# Patient Record
Sex: FEMALE | Race: BLACK OR AFRICAN AMERICAN | NOT HISPANIC OR LATINO | URBAN - METROPOLITAN AREA
[De-identification: names, ages, dates, MRNs, and addresses within clinical notes are randomized per-mention and may not be internally consistent; named-entity substitution may affect disease eponyms.]

---

## 2023-10-13 ENCOUNTER — HOSPITAL ENCOUNTER (EMERGENCY)
Facility: HOSPITAL | Age: 28
Discharge: HOME | End: 2023-10-14
Payer: COMMERCIAL

## 2023-10-13 DIAGNOSIS — N76.0 BACTERIAL VAGINOSIS: Primary | ICD-10-CM

## 2023-10-13 DIAGNOSIS — B37.31 YEAST VAGINITIS: ICD-10-CM

## 2023-10-13 DIAGNOSIS — B96.89 BACTERIAL VAGINOSIS: Primary | ICD-10-CM

## 2023-10-13 DIAGNOSIS — R11.2 NAUSEA AND VOMITING, UNSPECIFIED VOMITING TYPE: ICD-10-CM

## 2023-10-13 PROCEDURE — 83690 ASSAY OF LIPASE: CPT | Mod: CMCLAB

## 2023-10-13 PROCEDURE — 96375 TX/PRO/DX INJ NEW DRUG ADDON: CPT

## 2023-10-13 PROCEDURE — 76815 OB US LIMITED FETUS(S): CPT

## 2023-10-13 PROCEDURE — 96374 THER/PROPH/DIAG INJ IV PUSH: CPT

## 2023-10-13 PROCEDURE — 99284 EMERGENCY DEPT VISIT MOD MDM: CPT

## 2023-10-13 PROCEDURE — 96361 HYDRATE IV INFUSION ADD-ON: CPT

## 2023-10-13 PROCEDURE — 87389 HIV-1 AG W/HIV-1&-2 AB AG IA: CPT | Mod: CMCLAB

## 2023-10-13 PROCEDURE — 2580000001 HC RX 258 IV SOLUTIONS

## 2023-10-13 PROCEDURE — 80053 COMPREHEN METABOLIC PANEL: CPT | Mod: CMCLAB

## 2023-10-13 PROCEDURE — 86780 TREPONEMA PALLIDUM: CPT

## 2023-10-13 PROCEDURE — 36415 COLL VENOUS BLD VENIPUNCTURE: CPT | Mod: CMCLAB

## 2023-10-13 PROCEDURE — 2500000004 HC RX 250 GENERAL PHARMACY W/ HCPCS (ALT 636 FOR OP/ED)

## 2023-10-13 PROCEDURE — 87210 SMEAR WET MOUNT SALINE/INK: CPT

## 2023-10-13 PROCEDURE — 82947 ASSAY GLUCOSE BLOOD QUANT: CPT | Mod: 59

## 2023-10-13 PROCEDURE — 99284 EMERGENCY DEPT VISIT MOD MDM: CPT | Mod: 25

## 2023-10-13 PROCEDURE — 85025 COMPLETE CBC W/AUTO DIFF WBC: CPT

## 2023-10-13 RX ORDER — PROCHLORPERAZINE EDISYLATE 5 MG/ML
10 INJECTION INTRAMUSCULAR; INTRAVENOUS ONCE
Status: COMPLETED | OUTPATIENT
Start: 2023-10-13 | End: 2023-10-13

## 2023-10-13 RX ORDER — DIPHENHYDRAMINE HYDROCHLORIDE 50 MG/ML
25 INJECTION INTRAMUSCULAR; INTRAVENOUS ONCE
Status: COMPLETED | OUTPATIENT
Start: 2023-10-13 | End: 2023-10-13

## 2023-10-13 RX ORDER — ACETAMINOPHEN 325 MG/1
650 TABLET ORAL ONCE
Status: COMPLETED | OUTPATIENT
Start: 2023-10-13 | End: 2023-10-13

## 2023-10-13 RX ADMIN — PROCHLORPERAZINE EDISYLATE 10 MG: 5 INJECTION INTRAMUSCULAR; INTRAVENOUS at 23:50

## 2023-10-13 RX ADMIN — DIPHENHYDRAMINE HYDROCHLORIDE 25 MG: 50 INJECTION INTRAMUSCULAR; INTRAVENOUS at 23:50

## 2023-10-13 RX ADMIN — ACETAMINOPHEN 650 MG: 325 TABLET ORAL at 23:50

## 2023-10-13 RX ADMIN — SODIUM CHLORIDE, POTASSIUM CHLORIDE, SODIUM LACTATE AND CALCIUM CHLORIDE 1000 ML: 600; 310; 30; 20 INJECTION, SOLUTION INTRAVENOUS at 23:51

## 2023-10-13 ASSESSMENT — PAIN DESCRIPTION - ONSET: ONSET: ONGOING

## 2023-10-13 ASSESSMENT — LIFESTYLE VARIABLES
EVER HAD A DRINK FIRST THING IN THE MORNING TO STEADY YOUR NERVES TO GET RID OF A HANGOVER: NO
EVER FELT BAD OR GUILTY ABOUT YOUR DRINKING: NO
HAVE PEOPLE ANNOYED YOU BY CRITICIZING YOUR DRINKING: NO
HAVE YOU EVER FELT YOU SHOULD CUT DOWN ON YOUR DRINKING: NO
REASON UNABLE TO ASSESS: NO

## 2023-10-13 ASSESSMENT — PAIN - FUNCTIONAL ASSESSMENT: PAIN_FUNCTIONAL_ASSESSMENT: 0-10

## 2023-10-13 ASSESSMENT — PAIN DESCRIPTION - FREQUENCY: FREQUENCY: CONSTANT/CONTINUOUS

## 2023-10-13 ASSESSMENT — PAIN DESCRIPTION - LOCATION
LOCATION_2: ABDOMEN
LOCATION: HEAD

## 2023-10-13 ASSESSMENT — COLUMBIA-SUICIDE SEVERITY RATING SCALE - C-SSRS
6. HAVE YOU EVER DONE ANYTHING, STARTED TO DO ANYTHING, OR PREPARED TO DO ANYTHING TO END YOUR LIFE?: NO
1. IN THE PAST MONTH, HAVE YOU WISHED YOU WERE DEAD OR WISHED YOU COULD GO TO SLEEP AND NOT WAKE UP?: NO
2. HAVE YOU ACTUALLY HAD ANY THOUGHTS OF KILLING YOURSELF?: NO

## 2023-10-13 ASSESSMENT — PAIN DESCRIPTION - ORIENTATION: ORIENTATION_2: LOWER

## 2023-10-13 ASSESSMENT — PAIN SCALES - GENERAL
PAINLEVEL_OUTOF10: 10 - WORST POSSIBLE PAIN
PAINLEVEL_OUTOF10: 10 - WORST POSSIBLE PAIN

## 2023-10-13 ASSESSMENT — PAIN DESCRIPTION - PROGRESSION: CLINICAL_PROGRESSION: NOT CHANGED

## 2023-10-14 VITALS
DIASTOLIC BLOOD PRESSURE: 74 MMHG | WEIGHT: 190 LBS | OXYGEN SATURATION: 99 % | TEMPERATURE: 98.1 F | HEIGHT: 66 IN | SYSTOLIC BLOOD PRESSURE: 119 MMHG | HEART RATE: 69 BPM | BODY MASS INDEX: 30.53 KG/M2 | RESPIRATION RATE: 20 BRPM

## 2023-10-14 LAB
ALBUMIN SERPL BCP-MCNC: 4 G/DL (ref 3.4–5)
ALP SERPL-CCNC: 35 U/L (ref 33–110)
ALT SERPL W P-5'-P-CCNC: 41 U/L (ref 7–45)
AMORPH CRY #/AREA UR COMP ASSIST: ABNORMAL /HPF
ANION GAP SERPL CALC-SCNC: 14 MMOL/L (ref 10–20)
APPEARANCE UR: ABNORMAL
AST SERPL W P-5'-P-CCNC: 21 U/L (ref 9–39)
BASOPHILS # BLD AUTO: 0.03 X10*3/UL (ref 0–0.1)
BASOPHILS NFR BLD AUTO: 0.4 %
BILIRUB SERPL-MCNC: 0.3 MG/DL (ref 0–1.2)
BILIRUB UR STRIP.AUTO-MCNC: NEGATIVE MG/DL
BUN SERPL-MCNC: 10 MG/DL (ref 6–23)
CALCIUM SERPL-MCNC: 9.6 MG/DL (ref 8.6–10.6)
CHLORIDE SERPL-SCNC: 106 MMOL/L (ref 98–107)
CLUE CELLS SPEC QL WET PREP: PRESENT
CO2 SERPL-SCNC: 21 MMOL/L (ref 21–32)
COLOR UR: YELLOW
CREAT SERPL-MCNC: 0.52 MG/DL (ref 0.5–1.05)
EOSINOPHIL # BLD AUTO: 0.04 X10*3/UL (ref 0–0.7)
EOSINOPHIL NFR BLD AUTO: 0.6 %
ERYTHROCYTE [DISTWIDTH] IN BLOOD BY AUTOMATED COUNT: 11.3 % (ref 11.5–14.5)
GFR SERPL CREATININE-BSD FRML MDRD: >90 ML/MIN/1.73M*2
GLUCOSE BLD MANUAL STRIP-MCNC: 86 MG/DL (ref 74–99)
GLUCOSE SERPL-MCNC: 89 MG/DL (ref 74–99)
GLUCOSE UR STRIP.AUTO-MCNC: NEGATIVE MG/DL
HCT VFR BLD AUTO: 30.4 % (ref 36–46)
HGB BLD-MCNC: 11.3 G/DL (ref 12–16)
HIV 1+2 AB+HIV1 P24 AG SERPL QL IA: NONREACTIVE
HOLD SPECIMEN: NORMAL
IMM GRANULOCYTES # BLD AUTO: 0.01 X10*3/UL (ref 0–0.7)
IMM GRANULOCYTES NFR BLD AUTO: 0.1 % (ref 0–0.9)
KETONES UR STRIP.AUTO-MCNC: ABNORMAL MG/DL
LEUKOCYTE ESTERASE UR QL STRIP.AUTO: ABNORMAL
LIPASE SERPL-CCNC: 18 U/L (ref 9–82)
LYMPHOCYTES # BLD AUTO: 2.63 X10*3/UL (ref 1.2–4.8)
LYMPHOCYTES NFR BLD AUTO: 36.4 %
MCH RBC QN AUTO: 29.9 PG (ref 26–34)
MCHC RBC AUTO-ENTMCNC: 37.2 G/DL (ref 32–36)
MCV RBC AUTO: 80 FL (ref 80–100)
MONOCYTES # BLD AUTO: 1.07 X10*3/UL (ref 0.1–1)
MONOCYTES NFR BLD AUTO: 14.8 %
MUCOUS THREADS #/AREA URNS AUTO: ABNORMAL /LPF
NEUTROPHILS # BLD AUTO: 3.45 X10*3/UL (ref 1.2–7.7)
NEUTROPHILS NFR BLD AUTO: 47.7 %
NITRITE UR QL STRIP.AUTO: NEGATIVE
NRBC BLD-RTO: 0 /100 WBCS (ref 0–0)
PH UR STRIP.AUTO: 8 [PH]
PLATELET # BLD AUTO: 350 X10*3/UL (ref 150–450)
PMV BLD AUTO: 10.2 FL (ref 7.5–11.5)
POTASSIUM SERPL-SCNC: 3.6 MMOL/L (ref 3.5–5.3)
PROT SERPL-MCNC: 7.7 G/DL (ref 6.4–8.2)
PROT UR STRIP.AUTO-MCNC: NEGATIVE MG/DL
RBC # BLD AUTO: 3.78 X10*6/UL (ref 4–5.2)
RBC # UR STRIP.AUTO: NEGATIVE /UL
RBC #/AREA URNS AUTO: ABNORMAL /HPF
SODIUM SERPL-SCNC: 137 MMOL/L (ref 136–145)
SP GR UR STRIP.AUTO: 1.02
SQUAMOUS #/AREA URNS AUTO: ABNORMAL /HPF
T PALLIDUM AB SER QL: NONREACTIVE
T VAGINALIS SPEC QL WET PREP: ABNORMAL
UROBILINOGEN UR STRIP.AUTO-MCNC: <2 MG/DL
WBC # BLD AUTO: 7.2 X10*3/UL (ref 4.4–11.3)
WBC #/AREA URNS AUTO: ABNORMAL /HPF
WBC VAG QL WET PREP: >50
YEAST VAG QL WET PREP: PRESENT

## 2023-10-14 PROCEDURE — 76815 OB US LIMITED FETUS(S): CPT

## 2023-10-14 PROCEDURE — 87186 SC STD MICRODIL/AGAR DIL: CPT | Mod: CMCLAB

## 2023-10-14 PROCEDURE — 81001 URINALYSIS AUTO W/SCOPE: CPT | Mod: CMCLAB

## 2023-10-14 PROCEDURE — 87086 URINE CULTURE/COLONY COUNT: CPT

## 2023-10-14 RX ORDER — CLOTRIMAZOLE 1 %
1 CREAM (GRAM) TOPICAL 2 TIMES DAILY
Qty: 6 G | Refills: 0 | Status: SHIPPED | OUTPATIENT
Start: 2023-10-14 | End: 2023-11-13

## 2023-10-14 RX ORDER — ONDANSETRON 4 MG/1
4 TABLET, FILM COATED ORAL EVERY 6 HOURS
Qty: 12 TABLET | Refills: 0 | Status: SHIPPED | OUTPATIENT
Start: 2023-10-14 | End: 2023-10-17

## 2023-10-14 RX ORDER — METRONIDAZOLE 500 MG/1
500 TABLET ORAL 3 TIMES DAILY
Qty: 30 TABLET | Refills: 0 | Status: SHIPPED | OUTPATIENT
Start: 2023-10-14 | End: 2023-10-24

## 2023-10-14 RX ORDER — ACETAMINOPHEN 325 MG/1
650 TABLET ORAL EVERY 6 HOURS PRN
Qty: 20 TABLET | Refills: 0 | Status: SHIPPED | OUTPATIENT
Start: 2023-10-14 | End: 2023-10-17

## 2023-10-14 NOTE — ED PROCEDURE NOTE
Procedure    Performed by: Jr Keen PA-C  Authorized by: Jr Keen PA-C    Procedure: Pelvic Ultrasound    Exam: transabdominal exam  Findings:  IUP: The pelvis was visualized and there was an INTRAUTERINE PREGNANCY visualized (a yolk sac, fetal pole or fetus was seen).  Fetal Heart Rate: 165 (Di Di pregnancy. 1st FHR is 165, 2nd FHR is 173) bpm  Pelvic Free Fluid: The pelvis was visualized and was POSITIVE for free fluid. and Small amount of pelvic free fluid, likely physiologic    Impression:                     Jr Keen PA-C  10/14/23 0049

## 2023-10-14 NOTE — PROGRESS NOTES
This patient was signed out to me by outgoing provider.  Please see their note for initial patient presentation and work-up.  In brief this is a 28-year-old female, approximately 10 weeks gestation, G6, P4 with twins, who initially presented to the emergency department for chief complaint of nausea and vomiting with pregnancy.    Diagnostic testing was performed. Patient was given medications for symptom management including LR bolus, Tylenol, Compazine, and Benadryl.  Diagnostic testing showed no evidence of UTI, mild anemia, normal lipase and CMP, nonreactive HIV, and positive yeast/BV.  Patient treated with Flagyl and given Lotrimin cream.  Additional STD testing including for syphilis and trichomonas not yet back but the patient states that she feels improved and wants to leave at this point.  She was notified of all results.  Prescriptions given.  Advised to take as directed and to return with any new or worsening symptoms.  Advised to follow-up with OB/GYN/PCP as well.  Patient in agreement with this plan.  Discharged stable condition.  We did discuss the brat diet and the importance of clear fluids.

## 2023-10-14 NOTE — ED PROVIDER NOTES
HPI   Chief Complaint   Patient presents with    Nausea       Limitations to History: None    HPI: This is a 28-year-old female G6, P4 at 10 weeks gestation with twins who presents to the emergency room with nausea, vomiting, migraines, and decreased p.o. intake.  Patient states that her symptoms started 3 days ago and progressively worsened.  Patient endorses several episodes of nonbilious, nonbloody emesis.  Patient denies any changes in bowel habits.  Patient denies any urinary symptoms such as frequency, hesitancy, dysuria, hematuria.  Patient denies any vaginal symptoms such as discharge, bleeding, or itching.  Patient is agreeable to a pelvic exam at this time and is agreeable to STD testing including HIV and syphilis.  Patient would not like to be preemptively treated.  Patient denies worst headache of her life or thunderclap headaches.  Patient has had headaches similar to this in the past and states that she gets migraines frequently.    Additional History Obtained from: None    12 point review of systems was performed and is negative unless otherwise specified    ------------------------------------------------------------------------------------------------------------------------------------------  Physical Exam:    VS: As documented in the triage note and EMR flowsheet from this visit were reviewed.    CONSTITUTIONAL: Well appearing, well nourished, awake, alert, oriented to person, place, time/situation and in no apparent distress.   EYES: Clear bilaterally, pupils equal, round and reactive to light.   CARDIOVASCULAR: Normal rate, regular rhythm.  Heart sounds S1, S2.  No murmurs, rubs or gallops.  RESPIRATORY: Breath sounds clear and equal bilaterally. No wheezes or rhonchi.   GASTROINTESTINAL: Abdomen soft, non-distended, no rebound, no guarding.  Mild suprapubic tenderness to palpation without CVA tenderness.  Negative McBurney's point, Rovsing, psoas, Alexander sign.  No tenderness to palpation all 4  quadrants.  Bowel sounds noted all 4 quadrants.  : A pelvic exam was performed on patient.  A female chaperone in the room was Neli Bates.  There is normal external vaginal anatomy with warm mucous membranes of the inner vaginal walls.  The cervical os is visualized to be closed.  There is a moderate amount of cottage cheeselike discharge draining from the cervical os.  No pooling of blood or discharge in the vaginal vault.  On bimanual exam, there is no cervical motion tenderness or adnexal pain.  MUSCULOSKELETAL: Spine appears normal, range of motion is not limited, no muscle or joint tenderness.   NEUROLOGICAL: Alert and oriented, no focal deficits, no motor or sensory deficits.  Cranial nerves II through XII intact bilaterally.  No cerebellar ataxia.  Patient is able to ambulate effectively.  SKIN: Skin normal color for race, warm, dry and intact. No evidence of trauma.   PSYCHIATRIC: Alert and oriented to person, place, time/situation. normal mood and affect. No apparent risk to self or others.     ------------------------------------------------------------------------------------------------------------------------------------------    Medical Decision Making:    This is a 28-year-old G6, P4 female at 10 weeks gestation with twins who presents to the emergency room with nausea, vomiting, poor p.o. intake, headaches, and vaginal discharge.  Patient remained stable throughout course of care.  Clinical signs are most specific for decreased p.o. intake leading to dehydration.  Abdominal exam is benign and lowers concern for cholecystitis, pancreatitis, appendicitis, bowel obstruction.  Neurological exam is unremarkable and lowers concern for meningitis, intracranial bleed, mass.  Pelvic exam is concerning for yeast or BV but lowers concern for TOA, torsion, ectopic pregnancy, or PID.  Patient was administered Tylenol, Benadryl, Compazine for symptomatic management.  Patient was started on 1 L LR.  CBC with  differential, CMP, HIV, syphilis, lipase, urinalysis will be placed to culture, wet prep ordered.  Point-of-care ultrasound was ordered.  Please see procedure note for more details.  Ultrasound was significant for a viable intrauterine pregnancy of twins with a Di Di gestation. CBC was significant for hemoglobin of 11.3.  CMP was unremarkable.  Lipase was unremarkable.  HIV was nonreactive.  Urinalysis still pending at the time of handoff.  Wet prep was positive for yeast cells and clue cells.  Patient will be discharged home with prescriptions for clotrimazole and metronidazole.  Patient remains stable throughout the remainder course of this provider's care.      External Records Reviewed: I reviewed recent and relevant outside records including: Previous provider notes    Independent Interpretation of Studies:  I independently interpreted: Point-of-care ultrasound      Social Determinants Affecting Care:  None      AVA Mcbride PA-C  [facility]                            Brianna Coma Scale Score: 15                  Patient History   No past medical history on file.  No past surgical history on file.  No family history on file.  Social History     Tobacco Use    Smoking status: Not on file    Smokeless tobacco: Not on file   Substance Use Topics    Alcohol use: Not on file    Drug use: Not on file       Physical Exam   ED Triage Vitals [10/13/23 2233]   Temp Heart Rate Resp BP   36.7 °C (98.1 °F) 63 16 124/83      SpO2 Temp Source Heart Rate Source Patient Position   96 % Temporal Monitor Sitting      BP Location FiO2 (%)     Right arm --       Physical Exam    ED Course & MDM   ED Course as of 10/20/23 0313   Sat Oct 14, 2023   0042 Point of Care Ultrasound [AH]      ED Course User Index  [AH] Jr Keen PA-C         Diagnoses as of 10/20/23 0313   Bacterial vaginosis   Yeast vaginitis   Nausea and vomiting, unspecified  vomiting type       Medical Decision Making      Procedure  Procedures     Jr Keen PA-C  10/14/23 0214       Jr Keen PA-C  10/20/23 0312

## 2023-10-14 NOTE — ED TRIAGE NOTES
Patient to ED with EMS for nausea, vomiting for about 4 days. Unable to keep fluids or food down. 10 weeks OB with fifth pregnancy. Reporting weakness, a severe migraine, and lower ABD pain for the last 1-2 days. Denies vaginal bleeding.

## 2023-10-18 LAB — BACTERIA UR CULT: ABNORMAL

## 2023-10-20 ENCOUNTER — TELEPHONE (OUTPATIENT)
Dept: PHARMACY | Facility: HOSPITAL | Age: 28
End: 2023-10-20
Payer: COMMERCIAL

## 2023-10-20 NOTE — PROGRESS NOTES
EDPD Note: Rapid Result Review    Reviewed Mr./Mrs./Ms. Yehuda Griffin 's chart regarding a positive urine culture/result that was taken during their recent emergency room visit. The patient was not told about these results prior to leaving the emergency department. Therefore, patient was contacted and given proper education.     Patient has asymptomatic bacteriuria in pregnancy. Recently admitted at [facility]. Patient stated she has recurrent UTIs and oral therapy never works for her. Patient would prefer to return to [facility] ED for further care due to pregnancy status. Offered patient oral therapy for treatment, but patient respectfully declined.     Susceptibility data from last 90 days.  Collected Specimen Info Organism Ampicillin Cefazolin Cefazolin (uncomplicated UTIs only) Ciprofloxacin Gentamicin Nitrofurantoin Piperacillin/Tazobactam Trimethoprim/Sulfamethoxazole   10/14/23 Urine from Clean Catch/Voided Escherichia coli S S S S S S S S       No further follow up needed from EDPD Team.     Yolanda Bennett, PharmD

## 2024-04-18 ENCOUNTER — LAB REQUISITION (OUTPATIENT)
Dept: LAB | Facility: HOSPITAL | Age: 29
End: 2024-04-18
Payer: COMMERCIAL

## 2024-04-18 PROCEDURE — 88321 CONSLTJ&REPRT SLD PREP ELSWR: CPT | Performed by: PATHOLOGY

## 2024-04-19 LAB
LABORATORY COMMENT REPORT: NORMAL
PATH REPORT.COMMENTS IMP SPEC: NORMAL
PATH REPORT.FINAL DX SPEC: NORMAL
PATH REPORT.GROSS SPEC: NORMAL
PATH REPORT.RELEVANT HX SPEC: NORMAL
PATH REPORT.TOTAL CANCER: NORMAL

## 2024-05-15 ENCOUNTER — HOSPITAL ENCOUNTER (EMERGENCY)
Facility: HOSPITAL | Age: 29
Discharge: HOME | End: 2024-05-15
Payer: COMMERCIAL

## 2024-05-15 VITALS
HEIGHT: 66 IN | HEART RATE: 84 BPM | BODY MASS INDEX: 24.98 KG/M2 | OXYGEN SATURATION: 97 % | SYSTOLIC BLOOD PRESSURE: 137 MMHG | RESPIRATION RATE: 18 BRPM | WEIGHT: 155.42 LBS | TEMPERATURE: 98.6 F | DIASTOLIC BLOOD PRESSURE: 67 MMHG

## 2024-05-15 DIAGNOSIS — J03.90 TONSILLITIS: Primary | ICD-10-CM

## 2024-05-15 PROCEDURE — 96372 THER/PROPH/DIAG INJ SC/IM: CPT | Performed by: NURSE PRACTITIONER

## 2024-05-15 PROCEDURE — 99283 EMERGENCY DEPT VISIT LOW MDM: CPT

## 2024-05-15 PROCEDURE — 2500000004 HC RX 250 GENERAL PHARMACY W/ HCPCS (ALT 636 FOR OP/ED): Mod: JZ,SE | Performed by: NURSE PRACTITIONER

## 2024-05-15 PROCEDURE — 99284 EMERGENCY DEPT VISIT MOD MDM: CPT | Performed by: NURSE PRACTITIONER

## 2024-05-15 RX ADMIN — PENICILLIN G BENZATHINE 1.2 MILLION UNITS: 1200000 INJECTION, SUSPENSION INTRAMUSCULAR at 10:31

## 2024-05-15 RX ADMIN — DEXAMETHASONE 10 MG: 6 TABLET ORAL at 10:31

## 2024-05-15 ASSESSMENT — COLUMBIA-SUICIDE SEVERITY RATING SCALE - C-SSRS
2. HAVE YOU ACTUALLY HAD ANY THOUGHTS OF KILLING YOURSELF?: NO
6. HAVE YOU EVER DONE ANYTHING, STARTED TO DO ANYTHING, OR PREPARED TO DO ANYTHING TO END YOUR LIFE?: NO
1. IN THE PAST MONTH, HAVE YOU WISHED YOU WERE DEAD OR WISHED YOU COULD GO TO SLEEP AND NOT WAKE UP?: NO

## 2024-05-15 ASSESSMENT — LIFESTYLE VARIABLES
TOTAL SCORE: 0
HAVE PEOPLE ANNOYED YOU BY CRITICIZING YOUR DRINKING: NO
HAVE YOU EVER FELT YOU SHOULD CUT DOWN ON YOUR DRINKING: NO
EVER HAD A DRINK FIRST THING IN THE MORNING TO STEADY YOUR NERVES TO GET RID OF A HANGOVER: NO
EVER FELT BAD OR GUILTY ABOUT YOUR DRINKING: NO

## 2024-05-15 ASSESSMENT — PAIN DESCRIPTION - PAIN TYPE: TYPE: ACUTE PAIN

## 2024-05-15 ASSESSMENT — PAIN DESCRIPTION - LOCATION: LOCATION: THROAT

## 2024-05-15 ASSESSMENT — PAIN - FUNCTIONAL ASSESSMENT: PAIN_FUNCTIONAL_ASSESSMENT: 0-10

## 2024-05-15 ASSESSMENT — PAIN DESCRIPTION - PROGRESSION: CLINICAL_PROGRESSION: NOT CHANGED

## 2024-05-15 ASSESSMENT — PAIN SCALES - GENERAL: PAINLEVEL_OUTOF10: 5 - MODERATE PAIN

## 2024-05-15 NOTE — Clinical Note
Yehuda Clarke was seen and treated in our emergency department on 5/15/2024.  She may return to work on 05/16/2024.       If you have any questions or concerns, please don't hesitate to call.      Sahara Lucero, GAMAL-CNP
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
## 2025-01-14 ENCOUNTER — HOSPITAL ENCOUNTER (EMERGENCY)
Facility: HOSPITAL | Age: 30
Discharge: HOME | End: 2025-01-14
Payer: COMMERCIAL

## 2025-01-14 VITALS
HEART RATE: 87 BPM | RESPIRATION RATE: 16 BRPM | OXYGEN SATURATION: 98 % | BODY MASS INDEX: 30.53 KG/M2 | WEIGHT: 190 LBS | TEMPERATURE: 98.1 F | SYSTOLIC BLOOD PRESSURE: 137 MMHG | HEIGHT: 66 IN | DIASTOLIC BLOOD PRESSURE: 84 MMHG

## 2025-01-14 DIAGNOSIS — J02.9 SORE THROAT: Primary | ICD-10-CM

## 2025-01-14 LAB
FLUAV RNA RESP QL NAA+PROBE: NOT DETECTED
FLUBV RNA RESP QL NAA+PROBE: NOT DETECTED
S PYO DNA THROAT QL NAA+PROBE: NOT DETECTED
SARS-COV-2 RNA RESP QL NAA+PROBE: NOT DETECTED

## 2025-01-14 PROCEDURE — 99283 EMERGENCY DEPT VISIT LOW MDM: CPT

## 2025-01-14 PROCEDURE — 87636 SARSCOV2 & INF A&B AMP PRB: CPT

## 2025-01-14 PROCEDURE — 2500000004 HC RX 250 GENERAL PHARMACY W/ HCPCS (ALT 636 FOR OP/ED): Mod: SE

## 2025-01-14 PROCEDURE — 87651 STREP A DNA AMP PROBE: CPT

## 2025-01-14 PROCEDURE — 99284 EMERGENCY DEPT VISIT MOD MDM: CPT

## 2025-01-14 RX ORDER — KETOROLAC TROMETHAMINE 10 MG/1
20 TABLET, FILM COATED ORAL ONCE
Status: DISCONTINUED | OUTPATIENT
Start: 2025-01-14 | End: 2025-01-14

## 2025-01-14 RX ADMIN — DEXAMETHASONE 10 MG: 6 TABLET ORAL at 21:38

## 2025-01-14 ASSESSMENT — LIFESTYLE VARIABLES
EVER HAD A DRINK FIRST THING IN THE MORNING TO STEADY YOUR NERVES TO GET RID OF A HANGOVER: NO
HAVE PEOPLE ANNOYED YOU BY CRITICIZING YOUR DRINKING: NO
HAVE YOU EVER FELT YOU SHOULD CUT DOWN ON YOUR DRINKING: NO
EVER FELT BAD OR GUILTY ABOUT YOUR DRINKING: NO
TOTAL SCORE: 0

## 2025-01-14 ASSESSMENT — PAIN SCALES - GENERAL: PAINLEVEL_OUTOF10: 10 - WORST POSSIBLE PAIN

## 2025-01-14 ASSESSMENT — PAIN - FUNCTIONAL ASSESSMENT: PAIN_FUNCTIONAL_ASSESSMENT: 0-10

## 2025-01-14 ASSESSMENT — PAIN DESCRIPTION - LOCATION: LOCATION: THROAT

## 2025-01-14 ASSESSMENT — PAIN DESCRIPTION - DESCRIPTORS: DESCRIPTORS: ACHING

## 2025-01-14 ASSESSMENT — PAIN DESCRIPTION - FREQUENCY: FREQUENCY: CONSTANT/CONTINUOUS

## 2025-01-14 ASSESSMENT — PAIN DESCRIPTION - PAIN TYPE: TYPE: ACUTE PAIN

## 2025-01-15 NOTE — PROGRESS NOTES
Patient was handed off to me from the previous team. For full history, physical, and prior ED course, please see previous provider note prior to patient handoff. This is an addendum to the record.    This is a 29-year-old female presenting to the ED with sore throat.  Strep, influenza, COVID swabs are pending at the time of signout.  Swabs are negative.    Hospital Course/MDM:  Please see ED provider note for hospital course and MDM    Disposition: Left without treatment complete  Patient left the emergency department without notifying myself or RN.  Patient left the emergency department before swab results were resulted and before leaving a urine sample for a pregnancy test.    Aj Rivrea PA-C  Emergency Medicine

## 2025-01-15 NOTE — ED PROVIDER NOTES
HPI   Chief Complaint   Patient presents with    Sore Throat   This is a 29-year-old female with a past medical history significant for recurrent tonsillitis who presents to the ED with a sore throat. Patient states that she has had a sore throat for 1 day, worse with swallowing. She states that she gets strep throat 3-4 times a year and that her current symptoms feel similar. She denies any history of PTA. She has taken Tylenol and Motrin at home for pain.  She denies any throat swelling or difficulty breathing or swallowing.  Denies any sick contacts or recent history of travel.  Patient is requesting oral steroids and a shot of penicillin, as this is what she has received in the past and it has worked for her.    Denies fevers, chills, headache, dizziness, chest pain, SOB, ABD pain, N/V/D    Limitations to history: None  Independent Historians: Patient  External Records Reviewed: Prior ED notes    Patient History   No past medical history on file.  No past surgical history on file.  No family history on file.  Social History     Tobacco Use    Smoking status: Never    Smokeless tobacco: Never   Substance Use Topics    Alcohol use: Not on file    Drug use: Not on file       Physical Exam   ED Triage Vitals [01/14/25 2026]   Temperature Heart Rate Respirations BP   36.7 °C (98.1 °F) 87 16 137/84      Pulse Ox Temp Source Heart Rate Source Patient Position   98 % Temporal Monitor Sitting      BP Location FiO2 (%)     Left arm --       Physical Exam  Constitutional:       General: She is not in acute distress.     Appearance: She is not ill-appearing or diaphoretic.   HENT:      Head: Normocephalic and atraumatic.      Mouth/Throat:      Mouth: Mucous membranes are moist.      Pharynx: Uvula midline. No uvula swelling.      Tonsils: Tonsillar exudate present. 2+ on the right. 2+ on the left.      Comments: No trismus.  Midline nonedematous uvula.   Cardiovascular:      Rate and Rhythm: Normal rate and regular rhythm.       Heart sounds: Normal heart sounds.   Pulmonary:      Effort: Pulmonary effort is normal. No respiratory distress.      Breath sounds: Normal breath sounds.   Abdominal:      Palpations: Abdomen is soft.      Tenderness: There is no abdominal tenderness.   Musculoskeletal:         General: No deformity or signs of injury.      Cervical back: Normal range of motion and neck supple. No rigidity.   Lymphadenopathy:      Cervical: No cervical adenopathy.   Skin:     General: Skin is warm and dry.   Neurological:      General: No focal deficit present.      Mental Status: She is alert.         ED Course & MDM   Diagnoses as of 01/14/25 2216   Sore throat         Medical Decision Making  This is a 29-year-old female with a past medical history significant for recurrent tonsillitis who presents to the ED with a sore throat.  Patient states that she has had a sore throat for 1 day.      On physical exam, patient is sitting up comfortably, she is speaking in complete sentences with normal respiratory effort, no dyspnea or tachypnea. She is managing her own secretions and speaking in a clear, non muffled voice. No trismus. Mucous membranes are moist. There is +2 bilateral tonsillar swelling with exudates. Midline nonedematous uvula.  Neck is supple with full, nontender ROM and no cervical lymphadenopathy.     Obtained COVID and flu testing as well as a strep swab.  Administered Decadron for tonsillar swelling.  Signout given to oncoming provider pending lab results.  Anticipate discharge given patient's overall stable clinical picture.  Will place outpatient referral to ENT for follow-up.                           Yumiko Benjamin PA-C  01/14/25 2211